# Patient Record
Sex: FEMALE | Race: WHITE | NOT HISPANIC OR LATINO | URBAN - METROPOLITAN AREA
[De-identification: names, ages, dates, MRNs, and addresses within clinical notes are randomized per-mention and may not be internally consistent; named-entity substitution may affect disease eponyms.]

---
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
## 2025-07-28 NOTE — ED PROVIDER NOTE - CONSTITUTIONAL, MLM
EP (Electrophysiology) Progress Note    EP Attending: Dr. Lazo  NP/PA/Fellow: CHADWICK Braga Pager [phone]    Interval events: S/P CS single lead pacemaker implantation 7/27/25    Subjective: Resting in chair. Denies chest pain, SOB, dizziness or palpitations. Mild discomfort to pacemaker pocket    EP Medications:   Apixaban 2.5 mg BID - held    Medications:  Current Facility-Administered Medications   Medication Dose Route Frequency Provider Last Rate Last Admin    magnesium oxide (MAG-OX) tablet 400 mg  400 mg Oral Once Bernardino Duvall MD        [Held by provider] apixaBAN (ELIQUIS) tablet 2.5 mg  2.5 mg Oral Q12H Stacia Bond APNP   2.5 mg at 07/26/25 1231    Potassium Standard Replacement Protocol (Levels 3.5 and lower)   Does not apply See Admin Instructions Juvenal Hanson MD        Potassium Replacement (Levels 3.6 - 4)   Does not apply See Admin Instructions Juvenal Hanson MD        Magnesium Standard Replacement Protocol   Does not apply See Admin Instructions Juvenal Hanson MD        anastrozole (ARIMIDEX) tablet 1 mg  1 mg Oral QAM Juvenal Hanson MD   1 mg at 07/27/25 1049    levothyroxine (SYNTHROID, LEVOTHROID) tablet 88 mcg  88 mcg Oral Once per day on Monday Tuesday Wednesday Thursday Friday Denia Noguera MD   88 mcg at 07/28/25 0523    atorvastatin (LIPITOR) tablet 10 mg  10 mg Oral Nightly Denia Noguera MD   10 mg at 07/27/25 2010    sodium chloride 0.9 % injection 2 mL  2 mL Intracatheter 2 times per day Denia Noguera MD   2 mL at 07/28/25 0852    aspirin (ECOTRIN) enteric coated tablet 81 mg  81 mg Oral Daily Denia Noguera MD   81 mg at 07/28/25 0848    sodium chloride 0.9 % injection 2 mL  2 mL Intracatheter 2 times per day Jose J Fairchild MD   2 mL at 07/28/25 0852       ALLERGIES:  No Known Allergies  VITAL SIGNS:   Blood pressure 122/57, pulse (!) 60, temperature 98.1 °F (36.7 °C), temperature source Oral, resp. rate (!) 22, height 5' (1.524 m), weight (!) 44.8 kg (98  lb 12.3 oz), SpO2 95%, not currently breastfeeding.    PHYSICAL EXAMINATION  GENERAL APPEARANCE: Cooperative, not in acute distress.  HEENT:  Normocephalic, atraumatic.  CARDIOVASCULAR: Regular rate and rhythm, S1, S2.   RESPIRATORY: Clear to anterior/posterior fields bilaterally. No use of accessory muscles.  ABDOMEN: Soft, nontender  SKIN:  No rashes or nodules. Left pectoral pocket with steri-strips intact. No bleeding or hematoma  NEUROLOGIC: Cranial nerves grossly intact    RHYTHM/Telemetry personally reviewed: V paced    LAB  Lab Results   Component Value Date    PT 12.4 (H) 03/04/2025    TSH 1.266 04/08/2025    POTASSIUM 4.2 07/28/2025    CHLORIDE 100 07/28/2025    GLUCOSE 113 (H) 07/28/2025    CALCIUM 9.1 07/28/2025    CO2 27 07/28/2025    BUN 17 07/28/2025    CREATININE 0.67 07/28/2025    WBC 10.6 07/28/2025    RBC 3.26 (L) 07/28/2025    HCT 30.8 (L) 07/28/2025    HGB 10.1 (L) 07/28/2025    PLT 81 (L) 07/28/2025    INR 1.2 03/04/2025    AST 59 (H) 07/26/2025       Liver Function Test:  GOT/AST (Units/L)   Date Value   07/26/2025 59 (H)     GPT/ALT (Units/L)   Date Value   07/26/2025 23     No results found for: \"GGTP\"  Alkaline Phosphatase (Units/L)   Date Value   07/26/2025 95     Bilirubin, Total (mg/dL)   Date Value   07/26/2025 3.7 (H)       Impression:     Symptomatic bradycardia, complete heart block post TTVR   - s/p single chamber pacemaker with CS lead - chest xray and device check stable.    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 Anticoagulated with apixaban  - Echo showing EF 60%. LA volume 61 mL. LVPW 0.8 cm  - TSH 1.266  Severe TR s/p TTVR 7/25/25  Coronary artery disease with hx of PCI in '11  Hx of breast cancer  s/p lumpectomy and radiation  Hypertension    Recommendation:    Chest xray and device check stable. Site stable without bleeding or hematoma.   Hold apixaban until Wednesday 7/30/25.   Discussed activity restrictions, site care at home and follow up instructions. Patient verbalized understanding. All questions answered.  Will arrange for outpatient follow up with Dr. Lazo in 2 weeks.     CHADWICK Braga  [phone]    For EP questions between 8am-5pm please contact the NP/PA/Fellow listed above. If you do not get a response from above provider, or it is after 5pm and before 8am, please contact the answering service at [phone] and page on call EP Fellow.     normal (ped)... In no apparent distress and appears well developed.